# Patient Record
(demographics unavailable — no encounter records)

---

## 2025-06-12 NOTE — ASSESSMENT
[FreeTextEntry1] : Diagnosis: bladder lesions  Plan  UA CX cytology  Bladder biopsies at [facility]    Carl Cisneros MD, FACS, FRCS  of Urology [facility] Director of Laparoscopic and Robotic Surgery [facility] Director of Urology, [facility] Professor of Urology   (Office) [phone] (Cell)  [phone] [email or web address]     I performed history/review of imaging, discussed treatment plan with patient, agree with above transcription by MIKI.

## 2025-06-12 NOTE — HISTORY OF PRESENT ILLNESS
[FreeTextEntry1] : Dear Dr. Love (Urologist)  Thank you so much for the referral to help care for your patient.  Chief Complaint: Consult for surgery  Date of first visit: 06/12/2025   NOEL BLACKBURN   is a 49 year old  She has a history of chronic UTI per Dr. Love He has performed hydrodistension, DMSO, and urethral dilation as per 3 notes of his that I have reviewed with her  There are lesions in the bladder - images reviewed - erythematous and raised.   CT Abdomen and Pelvis      05/07/2025- reviewed  No evidence for upper or lower urinary tract pathology.

## 2025-06-12 NOTE — ASSESSMENT
[FreeTextEntry1] : Diagnosis: bladder lesions  Plan  UA CX cytology  Bladder biopsies at [facility]    Carl Cisneros MD, FACS, FRCS  of Urology [facility] Director of Laparoscopic and Robotic Surgery [facility] Director of Urology, [facility] Professor of Urology   (Office) [phone] (Cell)  [phone] [email or web address]     I performed history/review of imaging, discussed treatment plan with patient, agree with above transcription by MIKI.